# Patient Record
Sex: MALE | Race: WHITE | NOT HISPANIC OR LATINO
[De-identification: names, ages, dates, MRNs, and addresses within clinical notes are randomized per-mention and may not be internally consistent; named-entity substitution may affect disease eponyms.]

---

## 2019-12-03 ENCOUNTER — APPOINTMENT (OUTPATIENT)
Dept: ORTHOPEDIC SURGERY | Facility: CLINIC | Age: 44
End: 2019-12-03
Payer: COMMERCIAL

## 2019-12-03 VITALS
HEIGHT: 71 IN | SYSTOLIC BLOOD PRESSURE: 120 MMHG | HEART RATE: 77 BPM | DIASTOLIC BLOOD PRESSURE: 70 MMHG | WEIGHT: 180 LBS | BODY MASS INDEX: 25.2 KG/M2 | OXYGEN SATURATION: 98 %

## 2019-12-03 PROCEDURE — 99213 OFFICE O/P EST LOW 20 MIN: CPT

## 2019-12-06 NOTE — END OF VISIT
[FreeTextEntry3] : All medical record entries made by ENRIKE Mcdermott, acting as a scribe for this encounter under the direction of Bud Isbell MD . I have reviewed the chart and agree that the record accurately reflects my personal performance of the history, physical exam, assessment and plan. I have also personally directed, reviewed, and agreed with the chart. \par \par

## 2019-12-06 NOTE — PHYSICAL EXAM
[de-identified] : The patient is a well developed, well nourished male in no apparent distress. He is alert and oriented X 3 with a pleasant mood and appropriate affect. \par \par On physical examination of the right knee, his ROM is 0-125 degrees. The patient walks with a normal gait and stands in neutral alignment. There is no effusion. No warmth or erythema is noted. The patella is non tender to palpation medially or laterally. There is no crepitus noted. The apprehension and grind tests are negative. The extensor mechanism is intact. There is medial joint line tenderness. The Jasbir sign is positive. The Lachman and pivot shift tests are negative. There is no varus or valgus laxity at 0 or 30 degrees. No posterolateral or anteromedial laxity is noted. No masses are palpable. No other soft tissue or bony tenderness is noted. Quadriceps weakness is noted. Neurovascular function is intact.   [de-identified] : MRi of the right knee shows a small undersurface tear in the posterior horn of the medial meniscus

## 2019-12-06 NOTE — HISTORY OF PRESENT ILLNESS
[de-identified] : Bennett Barboza returns today for evaluation of his right knee. He was playing paddle tennis five weeks ago and felt a pull in the back of his right knee. He had initial swelling and difficulty walking. He rested and iced but his symptoms persisted. he was seen by a physician locally and was sent for an MRI which shows a small medial meniscus tear. He has been resting and slowly increasing his home exercise program. His symptoms have improved overall but he still has some discomfort throughout his daily acitivites. he denies any locking or buckling.

## 2019-12-06 NOTE — DISCUSSION/SUMMARY
[de-identified] : Mr Barboza has a small medial meniscus tear. He will continue to work on his nonimpact home exercise program. He will allow for another six weeks for complete resolution. All questions were answered. He will call if any issues arise.

## 2019-12-13 ENCOUNTER — APPOINTMENT (OUTPATIENT)
Dept: ORTHOPEDIC SURGERY | Facility: CLINIC | Age: 44
End: 2019-12-13
Payer: COMMERCIAL

## 2019-12-13 VITALS — SYSTOLIC BLOOD PRESSURE: 116 MMHG | HEART RATE: 74 BPM | DIASTOLIC BLOOD PRESSURE: 75 MMHG | OXYGEN SATURATION: 98 %

## 2019-12-13 PROCEDURE — 99213 OFFICE O/P EST LOW 20 MIN: CPT

## 2019-12-30 NOTE — PHYSICAL EXAM
[de-identified] : The patient is a well developed, well nourished male in no apparent distress. He is alert and oriented X 3 with a pleasant mood and appropriate affect. \par \par On physical examination of the right knee, his ROM is 0-125 degrees. The patient walks with a normal gait and stands in neutral alignment. There is no effusion. No warmth or erythema is noted. The patella is non tender to palpation medially or laterally. There is no crepitus noted. The apprehension and grind tests are negative. The extensor mechanism is intact. There is medial joint line tenderness. The Jasbir sign is positive. The Lachman and pivot shift tests are negative. There is no varus or valgus laxity at 0 or 30 degrees. No posterolateral or anteromedial laxity is noted. No masses are palpable. No other soft tissue or bony tenderness is noted. Quadriceps weakness is noted. Neurovascular function is intact.

## 2019-12-30 NOTE — HISTORY OF PRESENT ILLNESS
[de-identified] : Bennett Barboza returns today for evaluation of his right knee. He has a small medial meniscus tear and has been working on conservative management. He has been resting and slowly increasing his home exercise program. His symptoms have improved overall but he still has some discomfort throughout his daily acitivites. he denies any locking or buckling.

## 2021-05-04 ENCOUNTER — OUTPATIENT (OUTPATIENT)
Dept: OUTPATIENT SERVICES | Facility: HOSPITAL | Age: 46
LOS: 1 days | End: 2021-05-04
Payer: COMMERCIAL

## 2021-05-04 ENCOUNTER — APPOINTMENT (OUTPATIENT)
Dept: ORTHOPEDIC SURGERY | Facility: CLINIC | Age: 46
End: 2021-05-04
Payer: COMMERCIAL

## 2021-05-04 ENCOUNTER — RESULT REVIEW (OUTPATIENT)
Age: 46
End: 2021-05-04

## 2021-05-04 VITALS
BODY MASS INDEX: 26.32 KG/M2 | WEIGHT: 188 LBS | OXYGEN SATURATION: 98 % | HEART RATE: 76 BPM | HEIGHT: 71 IN | TEMPERATURE: 97.9 F | DIASTOLIC BLOOD PRESSURE: 80 MMHG | SYSTOLIC BLOOD PRESSURE: 120 MMHG

## 2021-05-04 DIAGNOSIS — M25.461 EFFUSION, RIGHT KNEE: ICD-10-CM

## 2021-05-04 DIAGNOSIS — Z78.9 OTHER SPECIFIED HEALTH STATUS: ICD-10-CM

## 2021-05-04 PROCEDURE — 73564 X-RAY EXAM KNEE 4 OR MORE: CPT | Mod: 26,50

## 2021-05-04 PROCEDURE — 99072 ADDL SUPL MATRL&STAF TM PHE: CPT

## 2021-05-04 PROCEDURE — 73564 X-RAY EXAM KNEE 4 OR MORE: CPT

## 2021-05-04 PROCEDURE — 99213 OFFICE O/P EST LOW 20 MIN: CPT

## 2021-05-04 RX ORDER — ETODOLAC 400 MG/1
400 TABLET, FILM COATED ORAL TWICE DAILY
Qty: 60 | Refills: 2 | Status: ACTIVE | COMMUNITY
Start: 2021-05-04 | End: 1900-01-01

## 2021-05-05 NOTE — HISTORY OF PRESENT ILLNESS
[de-identified] : Bennett returns today for evaluation of his right knee. He had a small medial mensicus tear in 2019 which resolved with time and PT. He has been doing quite well and remining active thorughout the pandemic. He reports that two weeks ago he slipped while walking down some stairs. He did not have any immediate knee pain. He then played a round of golf and his knee began to swell. He has severe lateral knee pain, limited ROM and difficulty walking. He denies any locking or buckling

## 2021-05-05 NOTE — DISCUSSION/SUMMARY
[de-identified] : yesi has severe lateral knee pain, swelling and limited ROM. He will begin a course of Lodine 400mg bid with food for the next two weeks to reduce inflammation. He will continue to ice his knee two to three times per day. He will be sent for an STAT MRI to r.o a lateral meniscus tear. WE will call him with the results. all questions were answered. He will call if any issues arise.

## 2021-05-05 NOTE — END OF VISIT
[FreeTextEntry3] : All medical record entries made by ENRIKE Mcdermott, acting as a scribe for this encounter under the direction of Bud Isbell MD . I have reviewed the chart and agree that the record accurately reflects my personal performance of the history, physical exam, assessment and plan. I have also personally directed, reviewed, and agreed with the chart.

## 2021-05-05 NOTE — PHYSICAL EXAM
[de-identified] : The patient is a well developed, well nourished male in no apparent distress. He is alert and oriented X 3 with a pleasant mood and appropriate affect. \par \par On physical examination of the right knee, his ROM is 0-115 degrees. The patient walks with an antalgic gait and stands in neutral alignment. There is 1+ effusion. No warmth or erythema is noted. The patella is non tender to palpation medially or laterally. There is no crepitus noted. The apprehension and grind tests are negative. The extensor mechanism is intact. There is lateral joint line tenderness. The Jasbir sign is positive. The Lachman and pivot shift tests are negative. There is no varus or valgus laxity at 0 or 30 degrees. No posterolateral or anteromedial laxity is noted. No masses are palpable. No other soft tissue or bony tenderness is noted. Quadriceps weakness is noted. Neurovascular function is intact.   [de-identified] : Radiographs performed today show well aligned and well maintained joint spacing bilaterally

## 2021-10-07 ENCOUNTER — APPOINTMENT (OUTPATIENT)
Dept: ORTHOPEDIC SURGERY | Facility: CLINIC | Age: 46
End: 2021-10-07
Payer: COMMERCIAL

## 2021-10-07 VITALS
DIASTOLIC BLOOD PRESSURE: 80 MMHG | BODY MASS INDEX: 26.18 KG/M2 | HEIGHT: 71 IN | TEMPERATURE: 98.1 F | OXYGEN SATURATION: 98 % | HEART RATE: 72 BPM | SYSTOLIC BLOOD PRESSURE: 120 MMHG | WEIGHT: 187 LBS

## 2021-10-07 PROCEDURE — 99213 OFFICE O/P EST LOW 20 MIN: CPT

## 2021-10-08 NOTE — DISCUSSION/SUMMARY
[de-identified] : Bennett has significant lateral knee pain. His ROM is limited and he has difficulty walking. He will be sent for an MR arthrogram to further evaluate for an occult lateral meniscus tear at the periphery. The Arthrogram will also further elucidate any degeneration within the popliteus tendon. He will continue to take OTC Nsaids and ice his knee. All questions were answered.  We will call him with the results of the arthrogram. He will call if any issues arise.

## 2021-10-08 NOTE — PHYSICAL EXAM
[de-identified] : The patient is a well developed, well nourished male in no apparent distress. He is alert and oriented X 3 with a pleasant mood and appropriate affect. \par \par On physical examination of the right knee, his ROM is 0-115 degrees. The patient walks with an antalgic gait and stands in neutral alignment. There is 1+ effusion. No warmth or erythema is noted. The patella is non tender to palpation medially or laterally. There is no crepitus noted. The apprehension and grind tests are negative. The extensor mechanism is intact. There is lateral joint line tenderness. The Jasbir sign is positive. The Lachman and pivot shift tests are negative. There is no varus or valgus laxity at 0 or 30 degrees. No posterolateral or anteromedial laxity is noted. No masses are palpable. No other soft tissue or bony tenderness is noted. Quadriceps weakness is noted. Neurovascular function is intact.   [de-identified] : MRI of the right knee from May 2021--IT band friction syndrome, degenerative signal within the poopliteus tendon, There is no meniscal tear noted.

## 2021-10-08 NOTE — HISTORY OF PRESENT ILLNESS
[de-identified] : Micheal Barboza returns today for evaluation of his right knee. He was seen in May after slipping down some stairs. He had an MRI at that time which showed no discrete meniscal tear. He was treated for IT band syndrome and completed a course of supervised PT. He was feeling better and his lateral knee symptoms had resolved. He has been playing golf without issue. He now reports a new injury in which he twisted his knee about a week ago. He now has sharp, severe lateral knee pain and swelling. He has limited ROM and difficulty walking. He has been icing and resting and taking NSAIDs without improvement.

## 2021-10-15 ENCOUNTER — RESULT REVIEW (OUTPATIENT)
Age: 46
End: 2021-10-15

## 2021-10-15 ENCOUNTER — APPOINTMENT (OUTPATIENT)
Dept: MRI IMAGING | Facility: CLINIC | Age: 46
End: 2021-10-15
Payer: COMMERCIAL

## 2021-10-15 ENCOUNTER — APPOINTMENT (OUTPATIENT)
Dept: ULTRASOUND IMAGING | Facility: CLINIC | Age: 46
End: 2021-10-15
Payer: COMMERCIAL

## 2021-10-15 ENCOUNTER — OUTPATIENT (OUTPATIENT)
Dept: OUTPATIENT SERVICES | Facility: HOSPITAL | Age: 46
LOS: 1 days | End: 2021-10-15

## 2021-10-15 PROCEDURE — 27369 NJX CNTRST KNE ARTHG/CT/MRI: CPT | Mod: RT

## 2021-10-15 PROCEDURE — 73722 MRI JOINT OF LWR EXTR W/DYE: CPT | Mod: 26,RT

## 2021-10-15 PROCEDURE — 20611 DRAIN/INJ JOINT/BURSA W/US: CPT | Mod: RT,59

## 2021-10-15 PROCEDURE — 20611 DRAIN/INJ JOINT/BURSA W/US: CPT | Mod: RT

## 2021-10-25 DIAGNOSIS — S83.241A OTHER TEAR OF MEDIAL MENISCUS, CURRENT INJURY, RIGHT KNEE, INITIAL ENCOUNTER: ICD-10-CM

## 2021-10-25 RX ORDER — ETODOLAC 400 MG/1
400 TABLET, FILM COATED ORAL TWICE DAILY
Qty: 60 | Refills: 2 | Status: ACTIVE | COMMUNITY
Start: 2021-10-25 | End: 1900-01-01

## 2021-11-09 ENCOUNTER — APPOINTMENT (OUTPATIENT)
Dept: ORTHOPEDIC SURGERY | Facility: CLINIC | Age: 46
End: 2021-11-09
Payer: COMMERCIAL

## 2021-11-09 DIAGNOSIS — M76.891 OTHER SPECIFIED ENTHESOPATHIES OF RIGHT LOWER LIMB, EXCLUDING FOOT: ICD-10-CM

## 2021-11-09 PROCEDURE — 99213 OFFICE O/P EST LOW 20 MIN: CPT

## 2021-11-09 NOTE — HISTORY OF PRESENT ILLNESS
[de-identified] : Micheal returns today for evaluation of his right knee. He has completed six sessions of PT and has taken Lodine for the last two weeks sporadically and without much relief. He reports persistent anterior knee pain and ongoing clicking. He has posterolateral knee pain with deep knee flexion like nandini pose. He has been riding his [place] which has caused an increase in clicking. He denies any swelling or medial knee pain. HIs MR Arthrogram shows popliteus tenditis and a small medial meniscus tear.

## 2021-11-09 NOTE — DISCUSSION/SUMMARY
[de-identified] : Micheal has shown some signs of improvement with PT. His lateral joint line pain is gone. he does have some persistent posterolateral pain and tightness with deep knee flexion which is consistent with popliteus tendinitis. He has increased PF crepitus which is interfeing with his home exercise program. he denies any medial knee pain. he has no mechanical symptoms of locking. He will take the next week and ramp up his level of activity to include paddle tennis. We will check in with him in one week and see how he is doing. We may consider arthroscopy if his symptoms persist. All questions were answered. he will call if any issues arise

## 2021-11-09 NOTE — PHYSICAL EXAM
[de-identified] : The patient is a well developed, well nourished male in no apparent distress. He is alert and oriented X 3 with a pleasant mood and appropriate affect. \par \par On physical examination of the right knee, his ROM is 0-125 degrees. The patient walks with a normal gait and stands in neutral alignment. There is no effusion. No warmth or erythema is noted. The patella is non tender to palpation medially or laterally. There is PF  crepitus noted. The apprehension and grind tests are negative. The extensor mechanism is intact. There is no joint line tenderness noted. There is mild tenderness noted over the popliteus. The Lachman and pivot shift tests are negative. There is no varus or valgus laxity at 0 or 30 degrees. No posterolateral or anteromedial laxity is noted. No masses are palpable. No other soft tissue or bony tenderness is noted. Quadriceps weakness is noted. Neurovascular function is intact.

## 2022-02-10 ENCOUNTER — TRANSCRIPTION ENCOUNTER (OUTPATIENT)
Age: 47
End: 2022-02-10

## 2022-02-11 ENCOUNTER — RESULT REVIEW (OUTPATIENT)
Age: 47
End: 2022-02-11

## 2022-02-11 ENCOUNTER — OUTPATIENT (OUTPATIENT)
Dept: OUTPATIENT SERVICES | Facility: HOSPITAL | Age: 47
LOS: 1 days | Discharge: HOME | End: 2022-02-11
Payer: COMMERCIAL

## 2022-02-11 PROCEDURE — 88309 TISSUE EXAM BY PATHOLOGIST: CPT | Mod: 26

## 2022-02-15 LAB — SURGICAL PATHOLOGY STUDY: SIGNIFICANT CHANGE UP

## 2022-08-08 NOTE — DISCUSSION/SUMMARY
[de-identified] : Mr Barboza has a small medial meniscus tear. He will continue to work on his nonimpact home exercise program. He will allow for another six weeks for complete resolution. All questions were answered. He will call if any issues arise. 
Initial (On Arrival)

## (undated) DEVICE — DRSG DERMABOND 0.7ML

## (undated) DEVICE — SUT MONOCRYL 3-0 18" PS-2 UNDYED

## (undated) DEVICE — DRSG TELFA 3 X 8

## (undated) DEVICE — SUT PDS II 2-0 27" SH

## (undated) DEVICE — SUT MONOCRYL 4-0 18" PS-2

## (undated) DEVICE — DRSG STERISTRIPS 0.5 X 4" TAN

## (undated) DEVICE — GLV 7.5 PROTEXIS (WHITE)

## (undated) DEVICE — SLV COMPRESSION KNEE MED

## (undated) DEVICE — SUT PDS II 3-0 27" SH

## (undated) DEVICE — SUT VICRYL 3-0 18" SH UNDYED (POP-OFF)

## (undated) DEVICE — WARMING BLANKET LOWER ADULT

## (undated) DEVICE — ELCTR BOVIE TIP BLADE INSULATED 2.75" EDGE

## (undated) DEVICE — PACK GENERAL MINOR